# Patient Record
Sex: MALE | Race: BLACK OR AFRICAN AMERICAN | NOT HISPANIC OR LATINO | Employment: FULL TIME | URBAN - METROPOLITAN AREA
[De-identification: names, ages, dates, MRNs, and addresses within clinical notes are randomized per-mention and may not be internally consistent; named-entity substitution may affect disease eponyms.]

---
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
## 2022-12-13 ENCOUNTER — HOSPITAL ENCOUNTER (EMERGENCY)
Facility: CLINIC | Age: 22
Discharge: LEFT BEFORE DISCHARGE | End: 2022-12-13
Payer: COMMERCIAL

## 2022-12-13 NOTE — ED TRIAGE NOTES
Feeling SOB with a cough for a few days.      Triage Assessment     Row Name 12/13/22 [id number]       Triage Assessment (Adult)    Airway WDL WDL       Respiratory WDL    Respiratory WDL WDL       Skin Circulation/Temperature WDL    Skin Circulation/Temperature WDL WDL       Cardiac WDL    Cardiac WDL WDL       Peripheral/Neurovascular WDL    Peripheral Neurovascular WDL WDL       Cognitive/Neuro/Behavioral WDL    Cognitive/Neuro/Behavioral WDL WDL

## 2023-04-08 ENCOUNTER — HOSPITAL ENCOUNTER (EMERGENCY)
Facility: CLINIC | Age: 23
Discharge: HOME | End: 2023-04-08
Attending: EMERGENCY MEDICINE | Admitting: EMERGENCY MEDICINE
Payer: COMMERCIAL

## 2023-04-08 VITALS
TEMPERATURE: 97.8 F | DIASTOLIC BLOOD PRESSURE: 84 MMHG | OXYGEN SATURATION: 98 % | RESPIRATION RATE: 12 BRPM | HEART RATE: 71 BPM | SYSTOLIC BLOOD PRESSURE: 138 MMHG

## 2023-04-08 DIAGNOSIS — R07.9 CHEST PAIN, UNSPECIFIED TYPE: ICD-10-CM

## 2023-04-08 LAB
ANION GAP SERPL CALCULATED.3IONS-SCNC: 13 MMOL/L (ref 7–15)
ATRIAL RATE - MUSE: 93 BPM
BASOPHILS # BLD AUTO: 0.1 10E3/UL (ref 0–0.2)
BASOPHILS NFR BLD AUTO: 1 %
BUN SERPL-MCNC: 10.9 MG/DL (ref 6–20)
CALCIUM SERPL-MCNC: 9.6 MG/DL (ref 8.6–10)
CHLORIDE SERPL-SCNC: 101 MMOL/L (ref 98–107)
CREAT SERPL-MCNC: 0.97 MG/DL (ref 0.67–1.17)
DEPRECATED HCO3 PLAS-SCNC: 25 MMOL/L (ref 22–29)
DIASTOLIC BLOOD PRESSURE - MUSE: NORMAL MMHG
EOSINOPHIL # BLD AUTO: 0.3 10E3/UL (ref 0–0.7)
EOSINOPHIL NFR BLD AUTO: 4 %
ERYTHROCYTE [DISTWIDTH] IN BLOOD BY AUTOMATED COUNT: 13 % (ref 10–15)
GFR SERPL CREATININE-BSD FRML MDRD: >90 ML/MIN/1.73M2
GLUCOSE SERPL-MCNC: 123 MG/DL (ref 70–99)
HCT VFR BLD AUTO: 49.1 % (ref 40–53)
HGB BLD-MCNC: 16.5 G/DL (ref 13.3–17.7)
HOLD SPECIMEN: NORMAL
IMM GRANULOCYTES # BLD: 0 10E3/UL
IMM GRANULOCYTES NFR BLD: 0 %
INTERPRETATION ECG - MUSE: NORMAL
LYMPHOCYTES # BLD AUTO: 3.4 10E3/UL (ref 0.8–5.3)
LYMPHOCYTES NFR BLD AUTO: 45 %
MCH RBC QN AUTO: 29.6 PG (ref 26.5–33)
MCHC RBC AUTO-ENTMCNC: 33.6 G/DL (ref 31.5–36.5)
MCV RBC AUTO: 88 FL (ref 78–100)
MONOCYTES # BLD AUTO: 0.8 10E3/UL (ref 0–1.3)
MONOCYTES NFR BLD AUTO: 10 %
NEUTROPHILS # BLD AUTO: 3.1 10E3/UL (ref 1.6–8.3)
NEUTROPHILS NFR BLD AUTO: 40 %
NRBC # BLD AUTO: 0 10E3/UL
NRBC BLD AUTO-RTO: 0 /100
P AXIS - MUSE: 54 DEGREES
PLATELET # BLD AUTO: 283 10E3/UL (ref 150–450)
POTASSIUM SERPL-SCNC: 4.1 MMOL/L (ref 3.4–5.3)
PR INTERVAL - MUSE: 176 MS
QRS DURATION - MUSE: 90 MS
QT - MUSE: 342 MS
QTC - MUSE: 425 MS
R AXIS - MUSE: 68 DEGREES
RBC # BLD AUTO: 5.58 10E6/UL (ref 4.4–5.9)
SODIUM SERPL-SCNC: 139 MMOL/L (ref 136–145)
SYSTOLIC BLOOD PRESSURE - MUSE: NORMAL MMHG
T AXIS - MUSE: 35 DEGREES
TROPONIN T SERPL HS-MCNC: <6 NG/L
VENTRICULAR RATE- MUSE: 93 BPM
WBC # BLD AUTO: 7.6 10E3/UL (ref 4–11)

## 2023-04-08 PROCEDURE — 36415 COLL VENOUS BLD VENIPUNCTURE: CPT | Performed by: EMERGENCY MEDICINE

## 2023-04-08 PROCEDURE — 85025 COMPLETE CBC W/AUTO DIFF WBC: CPT | Performed by: EMERGENCY MEDICINE

## 2023-04-08 PROCEDURE — 80048 BASIC METABOLIC PNL TOTAL CA: CPT | Performed by: EMERGENCY MEDICINE

## 2023-04-08 PROCEDURE — 99284 EMERGENCY DEPT VISIT MOD MDM: CPT

## 2023-04-08 PROCEDURE — 84484 ASSAY OF TROPONIN QUANT: CPT | Performed by: EMERGENCY MEDICINE

## 2023-04-08 PROCEDURE — 93005 ELECTROCARDIOGRAM TRACING: CPT

## 2023-04-08 ASSESSMENT — ACTIVITIES OF DAILY LIVING (ADL): ADLS_ACUITY_SCORE: 35

## 2023-04-08 NOTE — ED NOTES
"Pt on call light requesting \"that my information stay private\". Pt wanted to make sure that we don't communicate his care with anyone else. Pt informed about HIPPA which reassured him.   "

## 2023-04-08 NOTE — ED PROVIDER NOTES
History     Chief Complaint:  Chest Pain       HPI   Grady Garcia is a 23 year old male with a history of smoking and pre-diabetes who presents with chest pain.  He notes that he has been having anterior chest pain.  He notes that the chest pain has been ongoing for a long time and estimates for 2 to 3 years.  He notes its been worse recently and so he came here to the emergency department for evaluation.  Denies any associated shortness of breath.  He has not noticed that breathing makes his pain worse.  He has not had any leg pain or swelling.  No hemoptysis.  He is a smoker.  He notes that his doctors told him that he has prediabetes.  Denies any history of high cholesterol.  He has not noticed that movements make the pain worse.    No abdominal pain, vomiting or diarrhea.    ROS:  Review of Systems    Allergies:  No Known Allergies     Medications:    predniSONE (DELTASONE) 20 MG tablet        Past Medical History:    No past medical history on file.    Past Surgical History:    No past surgical history on file.     Family History:    family history is not on file.    Social History:   reports that he has been smoking cigarettes and cigarettes. He does not have any smokeless tobacco history on file.  PCP: [facility]     Physical Exam   Patient Vitals for the past 24 hrs:   BP Temp Temp src Pulse Resp SpO2   04/08/23 0330 138/84 -- -- 71 12 98 %   04/08/23 0300 (!) 130/94 -- -- 73 14 99 %   04/08/23 0239 (!) 138/115 -- -- 90 12 100 %   04/08/23 0223 (!) 152/79 97.8  F (36.6  C) Temporal 82 16 100 %        Physical Exam  Eyes:  The pupils are equal and round    Conjunctivae and sclerae are normal  ENT:    The nose is normal    Pinnae are normal  CV:  Regular rate and rhythm     No edema  Resp:  Lungs are clear and equal    Non-labored  GI:  Abdomen is soft, there is no rigidity    No distension    No rebound tenderness   MS:  Normal muscular tone    No asymmetric leg swelling    No  reproducible chest pain  Skin:  No rash or acute skin lesions noted  Neuro:   Awake, alert.      Speech is normal and fluent.    Face is symmetric.     Moves all extremities      Emergency Department Course     ECG results from 04/08/23   EKG 12 lead     Value    Systolic Blood Pressure     Diastolic Blood Pressure     Ventricular Rate 93    Atrial Rate 93    RI Interval 176    QRS Duration 90        QTc 425    P Axis 54    R AXIS 68    T Axis 35    Interpretation ECG      Sinus rhythm  Normal ECG  When compared with ECG of 16-NOV-2021 17:24,  No significant change was found  Confirmed by GENERATED REPORT, COMPUTER ([id number]),  Aden Bryant ([id number]) on 4/8/2023 3:05:22 AM          Laboratory:  Labs Ordered and Resulted from Time of ED Arrival to Time of ED Departure   BASIC METABOLIC PANEL - Abnormal       Result Value    Sodium 139      Potassium 4.1      Chloride 101      Carbon Dioxide (CO2) 25      Anion Gap 13      Urea Nitrogen 10.9      Creatinine 0.97      Calcium 9.6      Glucose 123 (*)     GFR Estimate >90     TROPONIN T, HIGH SENSITIVITY - Normal    Troponin T, High Sensitivity <6     CBC WITH PLATELETS AND DIFFERENTIAL    WBC Count 7.6      RBC Count 5.58      Hemoglobin 16.5      Hematocrit 49.1      MCV 88      MCH 29.6      MCHC 33.6      RDW 13.0      Platelet Count 283      % Neutrophils 40      % Lymphocytes 45      % Monocytes 10      % Eosinophils 4      % Basophils 1      % Immature Granulocytes 0      NRBCs per 100 WBC 0      Absolute Neutrophils 3.1      Absolute Lymphocytes 3.4      Absolute Monocytes 0.8      Absolute Eosinophils 0.3      Absolute Basophils 0.1      Absolute Immature Granulocytes 0.0      Absolute NRBCs 0.0          Emergency Department Course & Assessments:    Interventions:  Medications - No data to display     Social Determinants of Health affecting care:   None    Disposition:  The patient was discharged to home.     Impression & Plan        Medical Decision  Making:  Grady Garcia is a 23 year old male who presents to the emergency department with chest pain.  He notes its been ongoing for years but worsened recently.  No shortness of breath or other symptoms worrisome for pulmonary embolism.  He is PERC negative.  EKG is unchanged from prior and shows early repolarization.  Troponin is undetectable and given the duration of his symptoms have low suspicion for ACS.  Unclear of the exact cause of his symptoms.  Symptoms do not seem suggestive of aortic dissection specially given the duration and location.  No symptoms to suggest pneumonia or pneumothorax.  Lung sounds are clear and oxygenation is normal.  Recommended follow-up with PCP.  Return with any new or worrisome symptoms.      Diagnosis:    ICD-10-CM    1. Chest pain, unspecified type  R07.9            Discharge Medications:  Discharge Medication List as of 4/8/2023  3:39 AM             Lucien Hummel MD  4/8/2023   No att. providers found       Lucien Hummel MD  04/08/23 [id number]

## 2023-04-08 NOTE — ED NOTES
Pt hesistant to receive IV at this time. Preferred to wait to speak to the MD. Pt agreeable to get blood drawn via butterfly. Pt states he has had a bad experience in the past in a medical setting and apologized for asking many questions. Pt seems particularly nervous about anything that may be administered via IV, therefore did not want one at this time.

## 2023-04-08 NOTE — ED TRIAGE NOTES
Pt c/o midsternal chest pain especially with exertion since yesterday. Pt reports sob x couple months, but worse the past two days.

## 2023-08-31 ENCOUNTER — TELEPHONE (OUTPATIENT)
Dept: NURSING | Facility: CLINIC | Age: 23
End: 2023-08-31
Payer: COMMERCIAL

## 2023-09-01 NOTE — TELEPHONE ENCOUNTER
Pt just wondering if it's still ok to take his Ibuprofen that expires 8/2023. Writer advised him to purchase a new bottle after this month.         Pam Vee RN on 8/31/2023 at 8:45 PM

## 2025-07-16 ENCOUNTER — TRANSCRIBE ORDERS (OUTPATIENT)
Dept: OTHER | Age: 25
End: 2025-07-16

## 2025-07-16 DIAGNOSIS — H18.609 KERATOCONUS: Primary | ICD-10-CM

## 2025-07-21 ENCOUNTER — PATIENT OUTREACH (OUTPATIENT)
Dept: CARE COORDINATION | Facility: CLINIC | Age: 25
End: 2025-07-21
Payer: COMMERCIAL

## 2025-08-04 ENCOUNTER — OFFICE VISIT (OUTPATIENT)
Dept: OPHTHALMOLOGY | Facility: CLINIC | Age: 25
End: 2025-08-04
Attending: OPTOMETRIST
Payer: COMMERCIAL

## 2025-08-04 DIAGNOSIS — H18.623 UNSTABLE KERATOCONUS OF BOTH EYES: Primary | ICD-10-CM

## 2025-08-04 PROCEDURE — 99204 OFFICE O/P NEW MOD 45 MIN: CPT | Mod: GC | Performed by: OPHTHALMOLOGY

## 2025-08-04 PROCEDURE — G0463 HOSPITAL OUTPT CLINIC VISIT: HCPCS | Performed by: OPHTHALMOLOGY

## 2025-08-04 PROCEDURE — 92025 CPTRIZED CORNEAL TOPOGRAPHY: CPT | Performed by: OPHTHALMOLOGY

## 2025-08-04 ASSESSMENT — SLIT LAMP EXAM - LIDS
COMMENTS: NORMAL
COMMENTS: NORMAL

## 2025-08-04 ASSESSMENT — VISUAL ACUITY
OS_PH_SC: 20/40
OD_SC: 20/300
METHOD: SNELLEN - LINEAR
OD_PH_SC: 20/70
OS_SC: 20/125
OS_PH_SC+: -2+1
OD_PH_SC+: -1
OS_SC+: -1

## 2025-08-04 ASSESSMENT — TONOMETRY
OS_IOP_MMHG: 10
IOP_METHOD: ICARE
OD_IOP_MMHG: 7

## 2025-08-04 ASSESSMENT — CONF VISUAL FIELD
OS_NORMAL: 1
OD_INFERIOR_NASAL_RESTRICTION: 0
OD_SUPERIOR_TEMPORAL_RESTRICTION: 0
OD_INFERIOR_TEMPORAL_RESTRICTION: 0
OS_INFERIOR_TEMPORAL_RESTRICTION: 0
OD_SUPERIOR_NASAL_RESTRICTION: 0
OS_SUPERIOR_TEMPORAL_RESTRICTION: 0
OS_SUPERIOR_NASAL_RESTRICTION: 0
METHOD: COUNTING FINGERS
OD_NORMAL: 1
OS_INFERIOR_NASAL_RESTRICTION: 0

## 2025-08-06 DIAGNOSIS — H18.623 UNSTABLE KERATOCONUS OF BOTH EYES: Primary | ICD-10-CM
